# Patient Record
Sex: FEMALE | Race: OTHER | Employment: FULL TIME | URBAN - METROPOLITAN AREA
[De-identification: names, ages, dates, MRNs, and addresses within clinical notes are randomized per-mention and may not be internally consistent; named-entity substitution may affect disease eponyms.]

---

## 2022-07-25 ENCOUNTER — OFFICE VISIT (OUTPATIENT)
Dept: OBGYN CLINIC | Age: 37
End: 2022-07-25
Payer: COMMERCIAL

## 2022-07-25 VITALS
BODY MASS INDEX: 22.09 KG/M2 | WEIGHT: 117 LBS | DIASTOLIC BLOOD PRESSURE: 70 MMHG | SYSTOLIC BLOOD PRESSURE: 110 MMHG | HEIGHT: 61 IN

## 2022-07-25 DIAGNOSIS — Z01.419 WELL WOMAN EXAM WITH ROUTINE GYNECOLOGICAL EXAM: Primary | ICD-10-CM

## 2022-07-25 DIAGNOSIS — Z12.4 SCREENING FOR CERVICAL CANCER: ICD-10-CM

## 2022-07-25 DIAGNOSIS — Z11.3 SCREENING FOR STD (SEXUALLY TRANSMITTED DISEASE): ICD-10-CM

## 2022-07-25 LAB
HBV SURFACE AG SER QL: NONREACTIVE
HCV AB SER QL: NONREACTIVE
HIV 1+2 AB+HIV1 P24 AG SERPL QL IA: NONREACTIVE
HIV 1/2 RESULT COMMENT: NORMAL

## 2022-07-25 PROCEDURE — 99395 PREV VISIT EST AGE 18-39: CPT | Performed by: OBSTETRICS & GYNECOLOGY

## 2022-07-25 RX ORDER — NORGESTIMATE AND ETHINYL ESTRADIOL 7DAYSX3 LO
1 KIT ORAL DAILY
Qty: 3 PACKET | Refills: 3 | Status: SHIPPED | OUTPATIENT
Start: 2022-07-25

## 2022-07-25 NOTE — PATIENT INSTRUCTIONS
What you should know about HPV, Cervical Cancer, and Genital Warts    CERVICAL CANCER IS CAUSED BY CERTAIN TYPES OF A VIRUS. Cervical cancer is cancer of the cervix (the lower part of the uterus that connects to the vagina). Unlike other cancers, cervical cancer is not hereditary. It's caused by certain types of a virus, human papillomavirus (HPV). When a woman becomes infected with one of these types of HPV, and the virus doesn't go away on its own, normal cells can develop in the lining of the cervix. If these cells are not found early, precancers and then cancer can develop. CERVICAL CANCER: IT'S NOT TOO EARLY TO THINK ABOUT IT. American Cancer Society (ACS) estimates, that 27 women a day will be diagnosed with cervical cancer in the [place] in 2008. While half of all women diagnosed with cervical cancer are between 34-50 years old, many of these women could have initially been exposed to cancer-causing HPV types in their teens and 19's. THERE ARE MORE THAN 30 TYPES OF GENITAL HPV. The types of HPV that cause cervical cancer are different from the types that cause genital warts. All HPV types that affect the genital area can cause abnormal Pap test.    80% OF WOMEN WILL HAVE HAD HPV IN THEIR LIFETIME. Both men and women can have HPV, and it is easily spread. Any type of genital contact with someone who had HPV can put you at risk - intercourse isn't necessary. And since there are often no signs or symptoms, many people don't know they are passing it on. There are about 6 million new cases of genital HPV in the [place] each year. There are more than 30 types of genital HPV, and most will clear on their own. But for some women who don't clear certain types of the virus, cervical cancer can develop. There's no way to predict who will or won't clear the virus. GENITAL WARTS:  ANOTHER DISEASE CAUSED BY HPV.   While certain types of HPV can cause cervical cancer, other types can cause genital warts. Genital warts are usually soft, flesh-colored growths that can be raised or flat, small or large, alone or in clusters. There are on estimated 1 million new cases of genital warts each year in the [place]. While genital warts are not life threatening, they can be life altering. There are a number of ways to treat genital warts, including creams, removal by burning, freezing, or laser, and surgery. However, even after treatment, genital warts can return. In fact, 25% of cases return within 3 months. PROTECTION WITH GARDASIL. GARDASIL is the only cervical cancer vaccine that helps protect against 4 types of HPV:  2 types that cause 70% of cervical cancer cases, and 2 more types that cause 90% of genital warts cases. GARDASIL is for girls and young women ages 5 to 32. GARDASIL is given as 3 injections over 6 months (0, 2 months, 6 months). Getting all 3 doses will allow you or your daugter to get the full benefits of GARDASIL. GARDASIL WORKS TO HELP PREVENT ILLNESS. Like other vaccines, GARDASIL works to help prevent illness. That's why its recommended that girls 6to 15years of age (and as young as 5) get vaccinated. HPV vaccination is a part of the recommended vaccination schedule defined by the Centers for Disease Control and Prevention (CDC). Leading medical organizations recommend HPV vaccination including the American Academy of Pediatrics (AAP), the American Academy of Family Physicians (AAFP), and the [employer] of Obstetricians and Gynecologists (ACOG). IT'S NOT TOO LATE TO GET VACCINATED. Only a doctor or health care professional can tell you if GARDASIL is right for you. But, if you're already sexually active, you may still benefit from [address]. That's because even if you have been exposed to HPV, it's unlikely that you have been infected with all 4 types of the virus covered by GARDASIL. CERVICAL CANCER SCREENINGS ARE IMPORTANT.   Vaccination with GARDASIL is important, but it does not replace routine cervical cancer screenings. Pap tests look for abnormal cervical cells in the lining of the cervix before they have a chance to become precancers and then cervical cancer. Most often this change takes a number of years. But in rare cases it can happen within a year. IMPORTANT INFORMATION ABOUT GARDASIL. Anyone who is allergic to the ingredients of GARDASIL, including those severely allergic to yeast, should not receive the vaccine. GARDASIL is not for women who are pregnant. GARDASIL does not treat cervical cancer or genital warts. GARDASIL may not fully protect everyone, and does not prevent all types of cervical cancer, so it's important to continue routine cervical cancer screenings. GARDASIL will not protect against diseases caused by other HPV types or against diseases not caused by HPV. The side effects include pain, swelling, itching, bruising, and redness at the injection site, headaches, fever, nausea, dizziness, vomiting, and fainting. GARDASIL is given as 3 injections over 6 months. Only a doctor or health care professional can decide if GARDASIL is right for you or your daughter. PLEASE READ THE PATIENT INFORMATION FOR GARDASIL AND DISCUSS IT WITH YOUR DOCTOR OR HEALTH CARE PROFESSIONAL. Breast Awareness and Self-Exam           Beginning in their 19's, women should be told about the benefits and limitations of breast self-exam (BSE). Even those who choose not to de BSE should be aware of how their breast normally look and fell and report any new breast changes to a health professional as soon as they are found. Finding a breast change does not necessarily mean there is a cancer.     A women can notice changes by knowing how her breasts normally look and feel and feeling her breasts for changes (breast awareness), or by choosing to use a step-by-step approach (with a BSE)and using a specific schedule to examine her breasts. Women with breast implants can do BSE. It may be useful to have the surgeon help identify the edges of the implant so that you know what you are feeling. There is some thought that the implants push out the breast tissue and may make it easier to examine. Women who are pregnant or breastfeeding can also choose to examine their breasts regularly. The best time for a women to examine her breasts is when they are not tender or swollen (two or three days after the end of your period). Women who examine their breasts should have their technique reviewed during their periodic health exams by their health care professional.      How do you do a breast self-exam?  Remove all your clothes above the waist and lie down. When you are lying down and place your right hand behind your head. The exam is done while lying. Do not stand. This is because when lying down the breast tissue spreads evenly over the chest wall and is as thin as possible, making it much easier to feel all the breast tissue. Use the pads of the 3 middle fingers of your left hand to check your right breast. Use overlapping dime-sized circular motions of the finger pads to fell the breast tissue. Use 3 levels of pressure to feel of all your breast tissue. Use light pressure to feel the tissue close to the skin surface. Use medium pressure to feel a little deeper. Use firm pressure to feel your tissue close to your breastbone and ribs. Use each pressure level to feel your breast tissue before moving on to the next spot. It is normal to feel a firm ridge in the lower curve of each breast, but you should tell your doctor if you feel anything else out of the ordinary. If you're not sure how hard to press, talk with your doctor or nurse. Use each pressure level to feel the breast tissue before moving on to the next spot.   Check your entire breast, moving up and down as if following a strip from the collarbone to the bra line, and from the armpit to the ribs. Repeat until you have covered the entire breast.  Repeat this procedure for your left breast, using the pads of the 3 middle fingers of your right hand. Move around the breast in and up and down pattern starting at an imaginary line drawn straight down your side from the underarm and moving across the breast to the middle of the chest bone (sternum or breastbone). Be sure to check the entire breast area going down until you feel only ribs and up to the neck or collar bone (clavicle). There is some evidence to suggest that the up-and -down pattern (sometimes called the vertical pattern) is the most effective pattern for covering the entire breast without missing any breast tissue. Repeat the exam on your left breast, putting your left arm behind your head and using the finger pads of your right hand to do the exam.  While standing in front of the mirror with your hands pressing firmly down on your hips look at your breasts for any changes of size, shape, contour, or dimpling, or redness or scaliness of the nipple or breast skin. )The pressing down on the hips position contracts the chest wall muscles and enhances any breast changes.)  Examine each underarm while sitting up or standing and with your arm only slightly raised so you can easily feel in the area. Raising your are straight up tightens the tissue in the area and make it harder to examine. This procedure for doing breast self exam is different from previous recommendations. These changes represent an extensive review of the medical literature and input from an expert advisory group. There is evidence that this position (lying down), the area felt, pattern of coverage of the breast, and use of different amounts of pressure increase a woman's ability to find abnormal areas. Where can you learn more? Go to https://chpepicdevineb.[employer]. org and sign in to your [employer] account.  Enter P148 in the [address] Information box to learn more about \"Breast Self-Exam: Care Instructions. \"     If you do not have an account, please click on the \"Sign Up Now\" link. Current as of: September 8, 2021               Content Version: 13.2  © 1913-8212 [employer]. Care instructions adapted under license by [facility] ([facility]). If you have questions about a medical condition or this instruction, always ask your healthcare professional. [place] [address] any warranty or liability for your use of this information.      Vitamin D3 1,000 IU  Calcium Citrate 600mg

## 2022-07-25 NOTE — PROGRESS NOTES
Suzette Tom  is a 39 y.o. No obstetric history on file. who is here for an annual exam.      History  Past Medical History:   Diagnosis Date    Chlamydia     ON FILE  No past surgical history on file. PRESENT IN FILE  No current outpatient medications on file prior to visit. No current facility-administered medications on file prior to visit. SEE UPDATED LIST  No Known AllergiesSEE LIST  Social History     Tobacco Use    Smoking status: Never    Smokeless tobacco: Never   Substance Use Topics    Alcohol use: No      Family History   Problem Relation Age of Onset    Thyroid Disease Mother     Thyroid Disease Father     Ovarian Cancer Neg Hx     Breast Cancer Neg Hx     Colon Cancer Neg Hx      OBGYN History:             Physical Exam  Blood pressure 110/70, height 5' 1\" (1.549 m), weight 117 lb (53.1 kg), last menstrual period 07/24/2022. Body mass index is 22.11 kg/m². Lab Results   Component Value Date/Time    HGB 13.1 02/25/2020 10:04 AM      @LASTPROCAMB([id number])@  No results found for: HCGUQC, PREGU, HCGQR, THCGA1    HEENT unremarkable. Sclera non-icteric. Neck is supple without thyromegaly or nodes. Chest clear to auscultation. Heart regular rate and rhythm with no murmur, Breast exam reveals no masses or nipple discharge. No axillary notes are palpable. Abdomen is benign. BUS is normal.  Cervix IF  present. Pap smeaR performed. PRN  Bimanual exam reveals no masses. Assessment  39 y.o. No obstetric history on file. for annual exam.  Encounter Diagnoses   Name Primary? Well woman exam with routine gynecological exam Yes    Screening for cervical cancer        Plan  Orders Placed This Encounter   Procedures    PAP LB, Reflex HPV ASCUS     Standing Status:   Future     Standing Expiration Date:   7/25/2023     Order Specific Question:   Pap Source? (Required)     Answer:   cervical     Order Specific Question:   Pap Source?           (Required)     Answer: endocervical     Order Specific Question:   Pap collection method? (Required     Answer:   brush     Order Specific Question:   Pap collection method? (Required     Answer:   spatula     Order Specific Question:   Menstrual Status ? Answer:   Having periods [5]     Order Specific Question:   Date of LMP? (Required)     Answer:   7/24/2022     Order Specific Question:   Previous Treatment?           (Required)     Answer:   NONE   Std ch     kristina   wellness  \"DEXA ordered\",AT AGE 61          \"Screening olonoscopy ordered\",IF >46YO  DUE         \"Recommend Calcium/MVI\",IF >35YRS  \"Recommend Gardisil immunization\"IF AGE 9-45     }CONTRACEPTION DISCUSSED      STD CHECK OFFERED IF <32YO  ,MAMMOGRAM SCHEDULED IF >41YO          MOOD DISCUSSED             NOT SUICIDAL  HEALTH MEASURE DISCUSSED INCLUDING TEETH/EYE Fernando Zarate  APPDEBORAH                    DIET/EXERCISE /SLEEP    DISCUSSED                SMOKING DISCUSSED PRN      BLADDER CONTROL DISCUSSED

## 2022-07-26 LAB — RPR SER QL: NONREACTIVE

## 2022-07-28 LAB
CYTOLOGIST CVX/VAG CYTO: NORMAL
CYTOLOGY CVX/VAG DOC THIN PREP: NORMAL
HPV REFLEX: NORMAL
Lab: NORMAL
Lab: NORMAL
PATH REPORT.FINAL DX SPEC: NORMAL
STAT OF ADQ CVX/VAG CYTO-IMP: NORMAL

## 2023-07-06 RX ORDER — NORGESTIMATE AND ETHINYL ESTRADIOL
KIT
Qty: 28 TABLET | Refills: 11 | OUTPATIENT
Start: 2023-07-06

## 2023-07-24 RX ORDER — NORGESTIMATE AND ETHINYL ESTRADIOL 7DAYSX3 LO
1 KIT ORAL DAILY
Qty: 3 PACKET | Refills: 0 | Status: SHIPPED | OUTPATIENT
Start: 2023-07-24

## 2023-10-15 SDOH — ECONOMIC STABILITY: FOOD INSECURITY: WITHIN THE PAST 12 MONTHS, THE FOOD YOU BOUGHT JUST DIDN'T LAST AND YOU DIDN'T HAVE MONEY TO GET MORE.: NEVER TRUE

## 2023-10-15 SDOH — ECONOMIC STABILITY: TRANSPORTATION INSECURITY
IN THE PAST 12 MONTHS, HAS LACK OF TRANSPORTATION KEPT YOU FROM MEETINGS, WORK, OR FROM GETTING THINGS NEEDED FOR DAILY LIVING?: NO

## 2023-10-15 SDOH — ECONOMIC STABILITY: FOOD INSECURITY: WITHIN THE PAST 12 MONTHS, YOU WORRIED THAT YOUR FOOD WOULD RUN OUT BEFORE YOU GOT MONEY TO BUY MORE.: NEVER TRUE

## 2023-10-15 SDOH — ECONOMIC STABILITY: INCOME INSECURITY: HOW HARD IS IT FOR YOU TO PAY FOR THE VERY BASICS LIKE FOOD, HOUSING, MEDICAL CARE, AND HEATING?: NOT HARD AT ALL

## 2023-10-15 SDOH — ECONOMIC STABILITY: HOUSING INSECURITY
IN THE LAST 12 MONTHS, WAS THERE A TIME WHEN YOU DID NOT HAVE A STEADY PLACE TO SLEEP OR SLEPT IN A SHELTER (INCLUDING NOW)?: NO

## 2023-10-16 ENCOUNTER — OFFICE VISIT (OUTPATIENT)
Dept: OBGYN CLINIC | Age: 38
End: 2023-10-16
Payer: COMMERCIAL

## 2023-10-16 VITALS
BODY MASS INDEX: 22.66 KG/M2 | DIASTOLIC BLOOD PRESSURE: 70 MMHG | SYSTOLIC BLOOD PRESSURE: 112 MMHG | HEIGHT: 61 IN | WEIGHT: 120 LBS

## 2023-10-16 DIAGNOSIS — Z76.89 ENCOUNTER TO ESTABLISH CARE: ICD-10-CM

## 2023-10-16 DIAGNOSIS — Z01.419 WELL WOMAN EXAM WITH ROUTINE GYNECOLOGICAL EXAM: Primary | ICD-10-CM

## 2023-10-16 PROCEDURE — 99395 PREV VISIT EST AGE 18-39: CPT | Performed by: OBSTETRICS & GYNECOLOGY

## 2023-10-16 RX ORDER — NORGESTIMATE AND ETHINYL ESTRADIOL 7DAYSX3 LO
1 KIT ORAL DAILY
Qty: 3 PACKET | Refills: 4 | Status: SHIPPED | OUTPATIENT
Start: 2023-10-16

## 2023-10-16 NOTE — PROGRESS NOTES
CC:  Annual GYN exam    HPI:  40 y.o. No obstetric history on file. presents today for a routine gynecological examination. Patient's last menstrual period was 10/01/2023. Tony Ornelas PCP: Ritchie MITCHELL with referral today. Contraception:  OCPs, happy with these     Menses:  Regular     No contraindications to estrogen exist      Sexually active w/ male partner   No changes in sexual partners --declines STD testing        Ob hx:  G0    GYN HISTORY:  As per HPI     Last Pap:  2022 neg  Hx of Abnl Paps: no    Hx STDs: yes, remote     OB History          0    Para        Term                AB        Living             SAB        IAB        Ectopic        Molar        Multiple        Live Births                      Past Medical History:   Diagnosis Date    Chlamydia          History reviewed. No pertinent surgical history. Outpatient Encounter Medications as of 10/16/2023   Medication Sig Dispense Refill    Norgestim-Eth Estrad Triphasic (TRI-LO-NAYE) 0.18/0.215/0.25 MG-25 MCG TABS Take 1 tablet by mouth daily 3 packet 4    [DISCONTINUED] Norgestim-Eth Estrad Triphasic (TRI-LO-NAYE) 0.18/0.215/0.25 MG-25 MCG TABS Take 1 tablet by mouth daily 3 packet 0     No facility-administered encounter medications on file as of 10/16/2023. No Known Allergies      Family History   Problem Relation Age of Onset    Thyroid Disease Mother     Thyroid Disease Father     Ovarian Cancer Neg Hx     Breast Cancer Neg Hx     Colon Cancer Neg Hx          Social History     Socioeconomic History    Marital status: Single     Spouse name: None    Number of children: None    Years of education: None    Highest education level: None   Tobacco Use    Smoking status: Never    Smokeless tobacco: Never   Substance and Sexual Activity    Alcohol use: No    Drug use: No    Sexual activity: Yes     Partners: Male     Birth control/protection: Pill           ROS:  Constitutional: Negative for chills, fever and weight loss.

## 2023-10-30 RX ORDER — NORGESTIMATE AND ETHINYL ESTRADIOL
1 KIT DAILY
Qty: 28 TABLET | Refills: 2 | OUTPATIENT
Start: 2023-10-30

## 2023-11-29 RX ORDER — NORGESTIMATE AND ETHINYL ESTRADIOL 7DAYSX3 LO
1 KIT ORAL DAILY
Qty: 3 PACKET | Refills: 4 | Status: SHIPPED | OUTPATIENT
Start: 2023-11-29

## 2024-12-12 RX ORDER — NORGESTIMATE AND ETHINYL ESTRADIOL 7DAYSX3 LO
1 KIT ORAL DAILY
Qty: 3 PACKET | Refills: 4 | OUTPATIENT
Start: 2024-12-12

## 2024-12-23 RX ORDER — NORGESTIMATE AND ETHINYL ESTRADIOL
1 KIT DAILY
Qty: 84 TABLET | Refills: 4 | OUTPATIENT
Start: 2024-12-23